# Patient Record
Sex: MALE | Race: WHITE | NOT HISPANIC OR LATINO | Employment: FULL TIME | URBAN - METROPOLITAN AREA
[De-identification: names, ages, dates, MRNs, and addresses within clinical notes are randomized per-mention and may not be internally consistent; named-entity substitution may affect disease eponyms.]

---

## 2021-02-05 ENCOUNTER — HOSPITAL ENCOUNTER (EMERGENCY)
Facility: HOSPITAL | Age: 36
Discharge: HOME | End: 2021-02-06
Attending: EMERGENCY MEDICINE | Admitting: EMERGENCY MEDICINE

## 2021-02-05 ENCOUNTER — APPOINTMENT (OUTPATIENT)
Dept: CT IMAGING | Facility: HOSPITAL | Age: 36
End: 2021-02-05

## 2021-02-05 DIAGNOSIS — F41.8 SITUATIONAL ANXIETY: Primary | ICD-10-CM

## 2021-02-05 LAB
ALBUMIN SERPL-MCNC: 4.9 G/DL (ref 3.5–5.2)
ALBUMIN/GLOB SERPL: 2 G/DL
ALP SERPL-CCNC: 49 U/L (ref 39–117)
ALT SERPL W P-5'-P-CCNC: 63 U/L (ref 1–41)
AMPHET+METHAMPHET UR QL: NEGATIVE
ANION GAP SERPL CALCULATED.3IONS-SCNC: 13.5 MMOL/L (ref 5–15)
AST SERPL-CCNC: 45 U/L (ref 1–40)
BARBITURATES UR QL SCN: NEGATIVE
BASOPHILS # BLD AUTO: 0.03 10*3/MM3 (ref 0–0.2)
BASOPHILS NFR BLD AUTO: 0.3 % (ref 0–1.5)
BENZODIAZ UR QL SCN: NEGATIVE
BILIRUB SERPL-MCNC: 0.8 MG/DL (ref 0–1.2)
BUN SERPL-MCNC: 10 MG/DL (ref 6–20)
BUN/CREAT SERPL: 10.6 (ref 7–25)
CALCIUM SPEC-SCNC: 9.6 MG/DL (ref 8.6–10.5)
CANNABINOIDS SERPL QL: POSITIVE
CHLORIDE SERPL-SCNC: 105 MMOL/L (ref 98–107)
CO2 SERPL-SCNC: 23.5 MMOL/L (ref 22–29)
COCAINE UR QL: NEGATIVE
CREAT SERPL-MCNC: 0.94 MG/DL (ref 0.76–1.27)
DEPRECATED RDW RBC AUTO: 42.3 FL (ref 37–54)
EOSINOPHIL # BLD AUTO: 0.03 10*3/MM3 (ref 0–0.4)
EOSINOPHIL NFR BLD AUTO: 0.3 % (ref 0.3–6.2)
ERYTHROCYTE [DISTWIDTH] IN BLOOD BY AUTOMATED COUNT: 12.6 % (ref 12.3–15.4)
ETHANOL BLD-MCNC: <10 MG/DL (ref 0–10)
ETHANOL UR QL: <0.01 %
GFR SERPL CREATININE-BSD FRML MDRD: 91 ML/MIN/1.73
GLOBULIN UR ELPH-MCNC: 2.4 GM/DL
GLUCOSE SERPL-MCNC: 82 MG/DL (ref 65–99)
HCT VFR BLD AUTO: 47.2 % (ref 37.5–51)
HGB BLD-MCNC: 16.6 G/DL (ref 13–17.7)
IMM GRANULOCYTES # BLD AUTO: 0.03 10*3/MM3 (ref 0–0.05)
IMM GRANULOCYTES NFR BLD AUTO: 0.3 % (ref 0–0.5)
LYMPHOCYTES # BLD AUTO: 1.86 10*3/MM3 (ref 0.7–3.1)
LYMPHOCYTES NFR BLD AUTO: 19.2 % (ref 19.6–45.3)
MAGNESIUM SERPL-MCNC: 2.3 MG/DL (ref 1.6–2.6)
MCH RBC QN AUTO: 32.6 PG (ref 26.6–33)
MCHC RBC AUTO-ENTMCNC: 35.2 G/DL (ref 31.5–35.7)
MCV RBC AUTO: 92.7 FL (ref 79–97)
METHADONE UR QL SCN: NEGATIVE
MONOCYTES # BLD AUTO: 0.53 10*3/MM3 (ref 0.1–0.9)
MONOCYTES NFR BLD AUTO: 5.5 % (ref 5–12)
NEUTROPHILS NFR BLD AUTO: 7.23 10*3/MM3 (ref 1.7–7)
NEUTROPHILS NFR BLD AUTO: 74.4 % (ref 42.7–76)
NRBC BLD AUTO-RTO: 0 /100 WBC (ref 0–0.2)
OPIATES UR QL: NEGATIVE
OXYCODONE UR QL SCN: NEGATIVE
PLATELET # BLD AUTO: 155 10*3/MM3 (ref 140–450)
PMV BLD AUTO: 11.8 FL (ref 6–12)
POTASSIUM SERPL-SCNC: 4.1 MMOL/L (ref 3.5–5.2)
PROT SERPL-MCNC: 7.3 G/DL (ref 6–8.5)
RBC # BLD AUTO: 5.09 10*6/MM3 (ref 4.14–5.8)
SODIUM SERPL-SCNC: 142 MMOL/L (ref 136–145)
TROPONIN T SERPL-MCNC: <0.01 NG/ML (ref 0–0.03)
WBC # BLD AUTO: 9.71 10*3/MM3 (ref 3.4–10.8)

## 2021-02-05 PROCEDURE — 93010 ELECTROCARDIOGRAM REPORT: CPT | Performed by: INTERNAL MEDICINE

## 2021-02-05 PROCEDURE — 80307 DRUG TEST PRSMV CHEM ANLYZR: CPT | Performed by: PHYSICIAN ASSISTANT

## 2021-02-05 PROCEDURE — 85025 COMPLETE CBC W/AUTO DIFF WBC: CPT | Performed by: PHYSICIAN ASSISTANT

## 2021-02-05 PROCEDURE — 93005 ELECTROCARDIOGRAM TRACING: CPT | Performed by: PHYSICIAN ASSISTANT

## 2021-02-05 PROCEDURE — 80053 COMPREHEN METABOLIC PANEL: CPT | Performed by: PHYSICIAN ASSISTANT

## 2021-02-05 PROCEDURE — 70450 CT HEAD/BRAIN W/O DYE: CPT

## 2021-02-05 PROCEDURE — 99284 EMERGENCY DEPT VISIT MOD MDM: CPT

## 2021-02-05 PROCEDURE — 84484 ASSAY OF TROPONIN QUANT: CPT | Performed by: PHYSICIAN ASSISTANT

## 2021-02-05 PROCEDURE — 82077 ASSAY SPEC XCP UR&BREATH IA: CPT | Performed by: PHYSICIAN ASSISTANT

## 2021-02-05 PROCEDURE — 83735 ASSAY OF MAGNESIUM: CPT | Performed by: PHYSICIAN ASSISTANT

## 2021-02-05 RX ORDER — SODIUM CHLORIDE 0.9 % (FLUSH) 0.9 %
10 SYRINGE (ML) INJECTION AS NEEDED
Status: DISCONTINUED | OUTPATIENT
Start: 2021-02-05 | End: 2021-02-06 | Stop reason: HOSPADM
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
## 2021-02-06 VITALS
OXYGEN SATURATION: 100 % | RESPIRATION RATE: 16 BRPM | DIASTOLIC BLOOD PRESSURE: 86 MMHG | HEART RATE: 49 BPM | TEMPERATURE: 96.7 F | SYSTOLIC BLOOD PRESSURE: 108 MMHG

## 2021-02-06 PROCEDURE — 90791 PSYCH DIAGNOSTIC EVALUATION: CPT

## 2021-02-06 NOTE — ED PROVIDER NOTES
EMERGENCY DEPARTMENT ENCOUNTER    Room Number:  20/20  Date of encounter:  2/6/2021  PCP: Provider, No Known  Historian: Patient      HPI:  Chief Complaint: Syncopal episodes, anxiety  A complete HPI/ROS/PMH/PSH/SH/FH are unobtainable due to: Nothing    Context: Hakeem Arboleda is a 35 y.o. male who presents to the ED c/o 2 syncopal episodes that have occurred since August.  Patient states in both instances he was eating jerky and/or pork.  He felt that he may have choked in the process.  He states on both occasions he completely passed out and woke up with his close soiled surrounded by vomit.  He informs me that the most recent episode happened 10 days ago.  Since that time he has had major panic attacks.  He fears it is going to happen again and he is not going to wake up.  He is here for further evaluation.  He denies fever, chills, headache, neck pain, chest pain, palpitations, EtOH abuse, benzodiazepine use/polysubstance abuse.    PAST MEDICAL HISTORY  Active Ambulatory Problems     Diagnosis Date Noted   • No Active Ambulatory Problems     Resolved Ambulatory Problems     Diagnosis Date Noted   • No Resolved Ambulatory Problems     No Additional Past Medical History         PAST SURGICAL HISTORY  History reviewed. No pertinent surgical history.      FAMILY HISTORY  No family history on file.      SOCIAL HISTORY  Social History     Socioeconomic History   • Marital status:      Spouse name: Not on file   • Number of children: Not on file   • Years of education: Not on file   • Highest education level: Not on file         ALLERGIES  Patient has no known allergies.        REVIEW OF SYSTEMS  Review of Systems   Constitutional: Negative for chills and fever.   Respiratory: Negative.    Cardiovascular: Negative.    Gastrointestinal: Negative.    Genitourinary: Negative.    Musculoskeletal: Negative.    Neurological: Negative.    Psychiatric/Behavioral: The patient is nervous/anxious.         All systems  reviewed and negative except for those discussed in HPI.       PHYSICAL EXAM    I have reviewed the triage vital signs and nursing notes.    ED Triage Vitals   Temp Heart Rate Resp BP SpO2   02/05/21 1853 02/05/21 1852 02/05/21 1852 02/05/21 1852 02/05/21 1852   96.7 °F (35.9 °C) (!) 41 16 150/75 97 %      Temp src Heart Rate Source Patient Position BP Location FiO2 (%)   02/05/21 1853 02/05/21 2012 -- -- --   Tympanic Monitor          Physical Exam  GENERAL: Well nourished, nontoxic, no acute distress  HENT: nares patent, face symmetric, ENT exam unremarkable  EYES: no scleral icterus  CV: regular rhythm, bradycardic, no murmur, no unilateral leg swelling  RESPIRATORY: normal effort, lungs CTA B without wheezing or stridor.  ABDOMEN: soft, nontender  MUSCULOSKELETAL: no deformity  NEURO: alert and oriented x4, moves all extremities, follows commands  Psych: Patient appears anxious  SKIN: warm, dry        LAB RESULTS  Recent Results (from the past 24 hour(s))   ECG 12 Lead    Collection Time: 02/05/21  9:07 PM   Result Value Ref Range    QT Interval 436 ms   Comprehensive Metabolic Panel    Collection Time: 02/05/21  9:23 PM    Specimen: Blood   Result Value Ref Range    Glucose 82 65 - 99 mg/dL    BUN 10 6 - 20 mg/dL    Creatinine 0.94 0.76 - 1.27 mg/dL    Sodium 142 136 - 145 mmol/L    Potassium 4.1 3.5 - 5.2 mmol/L    Chloride 105 98 - 107 mmol/L    CO2 23.5 22.0 - 29.0 mmol/L    Calcium 9.6 8.6 - 10.5 mg/dL    Total Protein 7.3 6.0 - 8.5 g/dL    Albumin 4.90 3.50 - 5.20 g/dL    ALT (SGPT) 63 (H) 1 - 41 U/L    AST (SGOT) 45 (H) 1 - 40 U/L    Alkaline Phosphatase 49 39 - 117 U/L    Total Bilirubin 0.8 0.0 - 1.2 mg/dL    eGFR Non African Amer 91 >60 mL/min/1.73    Globulin 2.4 gm/dL    A/G Ratio 2.0 g/dL    BUN/Creatinine Ratio 10.6 7.0 - 25.0    Anion Gap 13.5 5.0 - 15.0 mmol/L   Magnesium    Collection Time: 02/05/21  9:23 PM    Specimen: Blood   Result Value Ref Range    Magnesium 2.3 1.6 - 2.6 mg/dL   Ethanol     Collection Time: 02/05/21  9:23 PM    Specimen: Blood   Result Value Ref Range    Ethanol <10 0 - 10 mg/dL    Ethanol % <0.010 %   CBC Auto Differential    Collection Time: 02/05/21  9:23 PM    Specimen: Blood   Result Value Ref Range    WBC 9.71 3.40 - 10.80 10*3/mm3    RBC 5.09 4.14 - 5.80 10*6/mm3    Hemoglobin 16.6 13.0 - 17.7 g/dL    Hematocrit 47.2 37.5 - 51.0 %    MCV 92.7 79.0 - 97.0 fL    MCH 32.6 26.6 - 33.0 pg    MCHC 35.2 31.5 - 35.7 g/dL    RDW 12.6 12.3 - 15.4 %    RDW-SD 42.3 37.0 - 54.0 fl    MPV 11.8 6.0 - 12.0 fL    Platelets 155 140 - 450 10*3/mm3    Neutrophil % 74.4 42.7 - 76.0 %    Lymphocyte % 19.2 (L) 19.6 - 45.3 %    Monocyte % 5.5 5.0 - 12.0 %    Eosinophil % 0.3 0.3 - 6.2 %    Basophil % 0.3 0.0 - 1.5 %    Immature Grans % 0.3 0.0 - 0.5 %    Neutrophils, Absolute 7.23 (H) 1.70 - 7.00 10*3/mm3    Lymphocytes, Absolute 1.86 0.70 - 3.10 10*3/mm3    Monocytes, Absolute 0.53 0.10 - 0.90 10*3/mm3    Eosinophils, Absolute 0.03 0.00 - 0.40 10*3/mm3    Basophils, Absolute 0.03 0.00 - 0.20 10*3/mm3    Immature Grans, Absolute 0.03 0.00 - 0.05 10*3/mm3    nRBC 0.0 0.0 - 0.2 /100 WBC   Troponin    Collection Time: 02/05/21  9:23 PM    Specimen: Blood   Result Value Ref Range    Troponin T <0.010 0.000 - 0.030 ng/mL   Urine Drug Screen - Urine, Clean Catch    Collection Time: 02/05/21  9:30 PM    Specimen: Urine, Clean Catch   Result Value Ref Range    Amphet/Methamphet, Screen Negative Negative    Barbiturates Screen, Urine Negative Negative    Benzodiazepine Screen, Urine Negative Negative    Cocaine Screen, Urine Negative Negative    Opiate Screen Negative Negative    THC, Screen, Urine Positive (A) Negative    Methadone Screen, Urine Negative Negative    Oxycodone Screen, Urine Negative Negative       Ordered the above labs and independently reviewed the results.        RADIOLOGY  Ct Head Without Contrast    Result Date: 2/5/2021  CT HEAD WITHOUT CONTRAST  HISTORY: Possible seizure-like activity   COMPARISON: None available.  TECHNIQUE: Axial CT imaging was obtained through the brain. No IV contrast was administered.  FINDINGS: No acute intracranial hemorrhage is identified. Ventricles are normal in size. There is no midline shift or mass effect. The paranasal sinuses and mastoid air cells appear clear.      No acute intracranial findings.  Radiation dose reduction techniques were utilized, including automated exposure control and exposure modulation based on body size.  This report was finalized on 2/5/2021 10:01 PM by Dr. Gita Hurley M.D.        I ordered the above noted radiological studies. Reviewed by me and discussed with radiologist.  See dictation for official radiology interpretation.      PROCEDURES    Procedures      MEDICATIONS GIVEN IN ER    Medications - No data to display      PROGRESS, DATA ANALYSIS, CONSULTS, AND MEDICAL DECISION MAKING    All labs have been independently reviewed by me.  All radiology studies have been reviewed by me and discussed with radiologist dictating the report.   EKG's independently viewed and interpreted by me.  Discussion below represents my analysis of pertinent findings related to patient's condition, differential diagnosis, treatment plan and final disposition.    DDx includes but is not limited to: Syncope secondary to fixation, seizure, arrhythmia, anxiety.  Given the patient's negative work-up and behavioral health evaluation.  It is felt that the most likely cause of his 2 events that occurred in August and 10 days ago and eventually been mild case of choking that was exacerbated by panic attack.  Regardless we will have the patient to follow-up with the PCP for further evaluation.  He is to return to the emergency department with any further symptoms or concerns.    ED Course as of Feb 06 0649 Fri Feb 05, 2021 2053 BP: 150/75 [RC]   2053 Temp: 96.7 °F (35.9 °C) [RC]   2053 Heart Rate(!): 41 [RC]   2053 Resp: 16 [RC]   2053 SpO2: 97 % [RC]   Sat  Feb 06, 2021   0230 The patient has been seen and cleared by access counselor.  Will discharge per prior conversation with Dr. Cazares.    [WC]      ED Course User Index  [RC] Lázaro Bower III, PA  [WC] Ray Plummer MD       Patient was placed in face mask in first look. Patient was wearing facemask when I entered the room and throughout our encounter. I wore full protective equipment throughout this patient encounter including a face mask, eye shield, gown and gloves. Hand hygiene was performed before donning protective equipment and after removal when leaving the room.    AS OF 06:49 EST VITALS:    BP - 108/86  HR - (!) 49  TEMP - 96.7 °F (35.9 °C) (Tympanic)  O2 SATS - 100%        DIAGNOSIS  Final diagnoses:   Situational anxiety         DISPOSITION  DISCHARGE    Patient discharged in stable condition.    Reviewed implications of results, diagnosis, meds, responsibility to follow up, warning signs and symptoms of possible worsening, potential complications and reasons to return to ER.    Patient/Family voiced understanding of above instructions.    Discussed plan for discharge, as there is no emergent indication for admission. Patient referred to primary care provider for BP management due to today's BP. Pt/family is agreeable and understands need for follow up and repeat testing.  Pt is aware that discharge does not mean that nothing is wrong but it indicates no emergency is present that requires admission and they must continue care with follow-up as given below or physician of their choice.     FOLLOW-UP  [facility]  [address] [address]  [phone]  Call            Medication List      No changes were made to your prescriptions during this visit.                Lázaro Bower III, PA  02/06/21 0650

## 2021-02-06 NOTE — CONSULTS
"Reports that in September or October, he had a possible choking event. Reports, \"I'm a big eater and I take big bites ... I was home, my wife was at work ... I was eating [employer] chicken ... started choking ... I just remember being horrified ... like 'oh no there's nothing I can do' \". Reports wife was at work, was home alone, and then woke up in bed with no idea how he got there. Reports he woke in bed in vomit and urine.     Reports that 10 days ago, \"I was eating some roast and gravy, you know roast is stringy stuff ... I just started choking on it, again, scared me, and I just started running, and I think it dislodged it\" and it went into esophagus and he was able to swallow.     Reports that 10 days ago, he was standing at counter, \"when I was chewing the roast, I realized that it was stringy and chewy and harder to chew again\". Reports that 10 days ago, his airway was never compromised, \"it was just like 2 or 3 seconds, like 'oh, my God' \". And experienced panic, and then passed out, thinking that he was gonna die.     Reports that grandpa told him that \"these things run in my family, anxiety, hypochondria, stuff like that\".     Reports he saw a counselor for a couple months at age 9 or 10, for anxiety and depression. Got teased in school for a birth defect (shortened finger) on hands.     Denies hx of suicide attempts, \"but I've thought about it\" most recent SI, 6 months ago, he reports. Denies recent thoughts of harming self/others.     Reports that 6 months ago, separately briefly from wife.     Denies alcohol use, reports he was smoking 1-2 joints most days. Reports he has noticed that marijuana can creat negative and scared thoughts, other times feels better on marijuana. Reports that he hasn't smoked a cigarette in 3 or 4 days, r/t his renewed concern about this health.     Reports that he has been experiencing panic for past 3 or 4 days (but does not appear particularly anxious at time of " "interview).     Reports his PCP diagnosed him with bipolar and schizoaffective d/o in 2013. Educated about s/s of daysi. Reports he remembers having a \"bipolar episode\" for 45 min once in 2013, when driving home.     Reports that if he could lower his anxiety that would improve his quality of life.     Educated about anxiety and treatments. Receptive to this.     Plan to d/c with OP resources, FUNMILAYO Bower agrees with plan.                                       "

## 2021-02-06 NOTE — ED PROVIDER NOTES
MD ATTESTATION NOTE    The HUSSEIN and I have discussed this patient's history, physical exam, and treatment plan.  I have reviewed the documentation and personally had a face to face interaction with the patient. I affirm the documentation and agree with the treatment and plan.  The attached note describes my personal findings.      History  35-year-old male presents with multiple complaints.  He complains of difficulty swallowing and choking on foods off and on.  This creates severe anxiety and patient is scared to eat.  He states he has had GI work-ups in the past and they have not found anything wrong.  Patient is concerned that this may be related to anxiety and panic disorder.    Physical Exam  Vital Signs reviewed  Alert, Well Appearing in NAD, very anxious  Heart regular without murmur  Lungs clear to auscultation bilaterally    EKG          EKG time: 2107  Rhythm/Rate: Sinus bradycardia 41  P waves and MO: Normal P waves and MO intervals  QRS, axis: Normal axis, normal QRS  ST and T waves: Nonspecific ST and T wave changes    Interpreted Contemporaneously by me, independently viewed  No prior to compare      Disposition  I discussed evaluation treatment this patient with FUNMILAYO Bower.  ED work-up has been fairly unremarkable with exception of noted bradycardia which patient states is chronic for him.  Labs and CT scan are normal.   Patient is quite anxious about swallowing troubles.  It is unclear whether this is GI related or related all to anxiety and panic disorder.  Will consult behavioral health for further assessment.     Fer Cazares MD  02/05/21 3311

## 2021-02-06 NOTE — ED NOTES
"Pt reports he choked on some food 10 days ago. Reports this is the second time this has happened in 3 months and he is now terrified of eating any solid foods  Pt states his anxiety has been coming on randomly and has not been feeling \"well because I have not been eating because i'm scared\"    Pt wearing mask, RN wearing mask and goggles at this time.       Terri Richards RN  02/05/21 2011    "

## 2021-02-07 LAB — QT INTERVAL: 436 MS

## 2023-01-12 ENCOUNTER — HOSPITAL ENCOUNTER (EMERGENCY)
Facility: HOSPITAL | Age: 38
Discharge: HOME | End: 2023-01-12
Attending: EMERGENCY MEDICINE | Admitting: EMERGENCY MEDICINE
Payer: MEDICAID

## 2023-01-12 ENCOUNTER — APPOINTMENT (OUTPATIENT)
Dept: GENERAL RADIOLOGY | Facility: HOSPITAL | Age: 38
End: 2023-01-12
Payer: MEDICAID

## 2023-01-12 VITALS
RESPIRATION RATE: 16 BRPM | SYSTOLIC BLOOD PRESSURE: 139 MMHG | DIASTOLIC BLOOD PRESSURE: 78 MMHG | HEART RATE: 64 BPM | OXYGEN SATURATION: 98 % | TEMPERATURE: 97.2 F

## 2023-01-12 DIAGNOSIS — S82.392A OTHER CLOSED FRACTURE OF DISTAL END OF LEFT TIBIA, INITIAL ENCOUNTER: Primary | ICD-10-CM

## 2023-01-12 PROCEDURE — 73590 X-RAY EXAM OF LOWER LEG: CPT

## 2023-01-12 PROCEDURE — 73610 X-RAY EXAM OF ANKLE: CPT

## 2023-01-12 PROCEDURE — 99283 EMERGENCY DEPT VISIT LOW MDM: CPT

## 2023-01-12 NOTE — DISCHARGE INSTRUCTIONS
Wear Cam walker  Home to rest  Ice to painful areas for 20 minutes at a time, 4 times a day  Elevated to help reduce swelling  Tylenol or Motrin as needed for mild-moderate pain-take as instructed on package  Follow up with Dr Ellison in 3-5 days, call to schedule an appointment  Return to er for any worsening or new concerns including increased pain or swelling

## 2023-01-12 NOTE — ED TRIAGE NOTES
From home with c/o L ankle pain/injury 8 days ago.  Went to [facility] that day and left without being seen due to long wait.  Had repeat outpt xray today and was advised has fx and to come to ER for eval.  Pt wearing mask on arrival.

## 2023-01-12 NOTE — ED PROVIDER NOTES
EMERGENCY DEPARTMENT ENCOUNTER    Room Number:  S01/01  Date of encounter:  1/12/2023  PCP: Provider, No Known  Patient Care Team:  Provider, No Known as PCP - General   Independent Historians: Patient        PPE: Patient was placed in face mask in first look. Patient was wearing facemask when I entered the room and throughout our encounter. I wore full protective equipment throughout this patient encounter including a face mask, and gloves. Hand hygiene was performed before donning protective equipment and after removal when leaving the room.      HPI:  Chief Complaint: Left ankle injury  A complete HPI/ROS/PMH/PSH/SH/FH are unobtainable due to: Nothing    Chronic or social conditions impacting patient care (social determinants of health): None    Context: Hakeem Arboleda is a 37 y.o. male who arrives to the ED via private vehicle.  Patient presents with c/o mild, intermittent, achy left ankle pain status post an injury 1/4/2023.  He states that he was at work when his left ankle got smashed between a forklift and a shelf.  He states he went to [facility] had x-rays however due to the lengthy wait time he left without being seen.  He states today at work they had a mobile x-ray, do x-rays of his left ankle and he was told it was broken.   Patient also complains of swelling and bruising to his left foot, ankle and lower leg.  Patient denies fever, chills, numbness or tingling to left foot, ankle or lower leg.  Patient states that rest makes the symptoms better and weightbearing worsens symptoms.  He states that he took Motrin today.    Review of prior external notes (non-ED): None    Review of prior external test results outside of this encounter: None    PAST MEDICAL HISTORY  Active Ambulatory Problems     Diagnosis Date Noted   • No Active Ambulatory Problems     Resolved Ambulatory Problems     Diagnosis Date Noted   • No Resolved Ambulatory Problems     No Additional Past Medical History       The  patient qualifies to receive the vaccine, but they have not yet received it.    PAST SURGICAL HISTORY  Past Surgical History:   Procedure Laterality Date   • ACHILLES TENDON REPAIR Left    • INGUINAL HERNIA REPAIR Right          FAMILY HISTORY  History reviewed. No pertinent family history.      SOCIAL HISTORY  Social History     Socioeconomic History   • Marital status:    Tobacco Use   • Smoking status: Every Day     Types: Cigarettes   Substance and Sexual Activity   • Drug use: Yes     Types: Marijuana   • Sexual activity: Yes         ALLERGIES  Patient has no known allergies.        REVIEW OF SYSTEMS  Review of Systems     All systems reviewed and negative except for those discussed in HPI.       PHYSICAL EXAM    I have reviewed the triage vital signs and nursing notes.    ED Triage Vitals   Temp Heart Rate Resp BP SpO2   01/12/23 1510 01/12/23 1510 01/12/23 1510 01/12/23 1521 01/12/23 1510   97.2 °F (36.2 °C) 64 16 140/90 98 %       Physical Exam  GENERAL: Well appearing, nontoxic appearing, not distressed  HENT: normocephalic, atraumatic  EYES: no scleral icterus, PERRL  CV: regular rhythm, regular rate, no murmur  RESPIRATORY: normal effort, CTAB  ABDOMEN: soft   MUSCULOSKELETAL: no deformity  Patient has swelling, ecchymosis to his left foot into his left calf.  No reproducible tenderness to palpation.  Soft compartments to the left lower leg.  No signs of compartment syndrome.  2+ DP and PT pulse on the left.  NEURO: alert, moves all extremities, follows commands, mental status normal/baseline  SKIN: warm, dry, no rash   Psych: Appropriate mood and affect  Nursing notes and vital signs reviewed          LAB RESULTS  No results found for this or any previous visit (from the past 24 hour(s)).    Ordered the above labs and independently reviewed the results.        RADIOLOGY  XR Ankle 3+ View Left, XR Tibia Fibula 2 View Left    Result Date: 1/12/2023  XR ANKLE 3+ VW LEFT-, XR TIBIA FIBULA 2 VW LEFT-   INDICATIONS: Trauma  TECHNIQUE: 3 views of the left ankle, 2 views of the left lower leg  COMPARISON: None available  FINDINGS:  Along the anterior margin of the distal end of the tibia, a 1 cm avulsion fracture fragment is apparent. No other evidence of fracture, erosion, or dislocation is identified. Slight/borderline widening of the medial tibiotalar interval could be evidence of ligamentous injury. Mild calcaneal spurring is present. Achilles enthesopathy is noted.       Distal tibia avulsion fracture.  This report was finalized on 1/12/2023 4:47 PM by Dr. Venkatesh Carias M.D.        I ordered the above noted radiological studies. Reviewed by me and discussed with radiologist.  See dictation for official radiology interpretation.      PROCEDURES    Procedures    DIFFERENTIAL DIAGNOSIS:  Differential Diagnosis for Lower Extremity Injury/trauma include but are not limited to the following:    Hip fracture/dislocation/contusion/sprain  Pelvic Fracture  Knee contusion/sprain/fracture/dislocation/internal derangement  Fracture/sprain of the femur, tibia, fibula, ankle, foot or digits  Claudication, Peripheral Vascular Disease, Gout, Osteoarthritis, Bursitis, Septic Joint,        PROGRESS, DATA ANALYSIS, CONSULTS, AND MEDICAL DECISION MAKING    All labs have been independently reviewed by me.  All radiology studies have been reviewed by me and discussed with radiologist dictating the report.   EKG's independently viewed and interpreted by me.  Discussion below represents my analysis of pertinent findings related to patient's condition, differential diagnosis, treatment plan and final disposition.        ED Course as of 01/12/23 1711   Thu Jan 12, 2023   1549 Patient is a well-appearing 37-year-old who presents today for x-rays of his left ankle and leg to evaluate for fracture status post a crush injury 1/4/2023.  X-rays of the left ankle and tib-fib have been ordered. [MS]   1551 Reviewed pt's history and workup  with Dr. Perales.  After a bedside evaluation, he agrees with the plan of care.     [MS]   1702 I viewed the patient's left ankle & tib/fib imaging in PACS.  My interpretation is distal tibia fracture.  See dictation for official radiology interpretation.     [MS]   1710 Patient updated on x-rays which showed distal tibial avulsion fracture.  Patient will be placed in a cam walker, was given an ankle follow-up but also discussed with him to check with his employer to see who they would prefer him to see since this was a Workmen's Comp. injury.  He was advised to ice and elevate and take ibuprofen.  He was provided strict return to ER precautions.  He verbalized understanding and is agreeable to this plan. [MS]      ED Course User Index  [MS] Mel Brito, APRN         DISPOSITION  ED Disposition     ED Disposition   Discharge    Condition   Stable    Comment   --           Discussed plan for discharge, as there is no emergent indication for admission. Pt/family is agreeable and understands need for follow up and repeat testing.  Pt is aware that discharge does not mean that nothing is wrong but it indicates no emergency is present that requires admission and they must continue care with follow-up as given below or physician of their choice.   Patient/Family voiced understanding of above instructions.  Patient discharged in stable condition.    DIAGNOSIS  Final diagnoses:   Other closed fracture of distal end of left tibia, initial encounter       FOLLOW UP   Wilmar Ellison DPM  [address] [address]  [phone]    Call in 1 day      [facility]   [address] [facility]   [facility] [address] [phone]  Call         RX     Medication List      No changes were made to your prescriptions during this visit.             AS OF 17:11 EST VITALS:    BP - 139/78  HR - 64  TEMP - 97.2 °F (36.2 °C) (Tympanic)  O2 SATS -  98%      MEDICATIONS GIVEN IN ER    Medications - No data to display             Note Disclaimer: At [facility], we believe that sharing information builds trust and better relationships. You are receiving this note because you recently visited [facility]. It is possible you will see health information before a provider has talked with you about it. This kind of information can be easy to misunderstand. To help you fully understand what it means for your health, we urge you to discuss this note with your provider.       Mel Brito, APRN  01/12/23 9572

## 2023-01-12 NOTE — ED PROVIDER NOTES
MD ATTESTATION NOTE    The HUSSEIN and I have discussed this patient's history, physical exam, and treatment plan.  I have reviewed the documentation and personally had a face to face interaction with the patient. I affirm the documentation and agree with the treatment and plan.  The attached note describes my personal findings.      I provided a substantive portion of the care of the patient.  I personally performed the physical exam in its entirety, and below are my findings.  For this patient encounter, the patient wore surgical mask, I wore full protective PPE including N95 and eye protection.      Brief HPI: Patient presents for crush injury to left leg and foot.  Patient states happened at work on the fourth.  Foot caught between 2 metal objects.  Patient states he had mobile x-ray that said it might be broken.  Has had improvement of pain.  No vomiting or diarrhea.  No head injury.    PHYSICAL EXAM  ED Triage Vitals   Temp Heart Rate Resp BP SpO2   01/12/23 1510 01/12/23 1510 01/12/23 1510 01/12/23 1521 01/12/23 1510   97.2 °F (36.2 °C) 64 16 140/90 98 %      Temp src Heart Rate Source Patient Position BP Location FiO2 (%)   01/12/23 1510 -- 01/12/23 1521 01/12/23 1521 --   Tympanic  Sitting Left arm          GENERAL: no acute distress  HENT: nares patent  EYES: no scleral icterus  RESPIRATORY: normal effort  MUSCULOSKELETAL: Patient with bruising to left ankle tib-fib and foot with minimal tenderness  NEURO: alert, moves all extremities, follows commands  PSYCH:  calm, cooperative  SKIN: warm, dry    Vital signs and nursing notes reviewed.        Plan: Ankle and foot x-ray       Stephane Perales MD  01/12/23 [id number]